# Patient Record
Sex: MALE | Race: BLACK OR AFRICAN AMERICAN | Employment: FULL TIME | URBAN - METROPOLITAN AREA
[De-identification: names, ages, dates, MRNs, and addresses within clinical notes are randomized per-mention and may not be internally consistent; named-entity substitution may affect disease eponyms.]

---

## 2018-08-27 ENCOUNTER — HOSPITAL ENCOUNTER (INPATIENT)
Age: 58
LOS: 1 days | Discharge: ANOTHER HOSPITAL | DRG: 441 | End: 2018-08-28
Attending: EMERGENCY MEDICINE | Admitting: HOSPITALIST
Payer: COMMERCIAL

## 2018-08-27 ENCOUNTER — APPOINTMENT (OUTPATIENT)
Dept: GENERAL RADIOLOGY | Age: 58
DRG: 441 | End: 2018-08-27
Attending: PHYSICIAN ASSISTANT
Payer: COMMERCIAL

## 2018-08-27 ENCOUNTER — APPOINTMENT (OUTPATIENT)
Dept: CT IMAGING | Age: 58
DRG: 441 | End: 2018-08-27
Attending: PHYSICIAN ASSISTANT
Payer: COMMERCIAL

## 2018-08-27 DIAGNOSIS — R79.89 INCREASED AMMONIA LEVEL: ICD-10-CM

## 2018-08-27 DIAGNOSIS — R18.8 CIRRHOSIS OF LIVER WITH ASCITES, UNSPECIFIED HEPATIC CIRRHOSIS TYPE (HCC): ICD-10-CM

## 2018-08-27 DIAGNOSIS — N28.9 ACUTE ON CHRONIC RENAL INSUFFICIENCY: Primary | ICD-10-CM

## 2018-08-27 DIAGNOSIS — K74.60 CIRRHOSIS OF LIVER WITH ASCITES, UNSPECIFIED HEPATIC CIRRHOSIS TYPE (HCC): ICD-10-CM

## 2018-08-27 DIAGNOSIS — N18.9 ACUTE ON CHRONIC RENAL INSUFFICIENCY: Primary | ICD-10-CM

## 2018-08-27 PROBLEM — N17.9 ACUTE ON CHRONIC RENAL FAILURE (HCC): Status: ACTIVE | Noted: 2018-08-27

## 2018-08-27 PROBLEM — K76.82 HEPATIC ENCEPHALOPATHY: Status: ACTIVE | Noted: 2018-08-27

## 2018-08-27 LAB
ALBUMIN SERPL-MCNC: 3.7 G/DL (ref 3.4–5)
ALBUMIN/GLOB SERPL: 1 {RATIO} (ref 0.8–1.7)
ALP SERPL-CCNC: 191 U/L (ref 45–117)
ALT SERPL-CCNC: 32 U/L (ref 16–61)
AMMONIA PLAS-SCNC: 144 UMOL/L (ref 11–32)
ANION GAP SERPL CALC-SCNC: 12 MMOL/L (ref 3–18)
APTT PPP: 34.6 SEC (ref 23–36.4)
AST SERPL-CCNC: 49 U/L (ref 15–37)
BASOPHILS # BLD: 0 K/UL (ref 0–0.1)
BASOPHILS NFR BLD: 0 % (ref 0–2)
BILIRUB SERPL-MCNC: 2 MG/DL (ref 0.2–1)
BUN SERPL-MCNC: 57 MG/DL (ref 7–18)
BUN/CREAT SERPL: 12 (ref 12–20)
CALCIUM SERPL-MCNC: 9.4 MG/DL (ref 8.5–10.1)
CHLORIDE SERPL-SCNC: 104 MMOL/L (ref 100–108)
CK MB CFR SERPL CALC: 1.1 % (ref 0–4)
CK MB SERPL-MCNC: 1.5 NG/ML (ref 5–25)
CK SERPL-CCNC: 141 U/L (ref 39–308)
CO2 SERPL-SCNC: 24 MMOL/L (ref 21–32)
CREAT SERPL-MCNC: 4.95 MG/DL (ref 0.6–1.3)
DIFFERENTIAL METHOD BLD: ABNORMAL
EOSINOPHIL # BLD: 0 K/UL (ref 0–0.4)
EOSINOPHIL NFR BLD: 1 % (ref 0–5)
ERYTHROCYTE [DISTWIDTH] IN BLOOD BY AUTOMATED COUNT: 14.9 % (ref 11.6–14.5)
GLOBULIN SER CALC-MCNC: 3.7 G/DL (ref 2–4)
GLUCOSE SERPL-MCNC: 120 MG/DL (ref 74–99)
HCT VFR BLD AUTO: 33.4 % (ref 36–48)
HGB BLD-MCNC: 11.5 G/DL (ref 13–16)
INR PPP: 1.6 (ref 0.8–1.2)
LIPASE SERPL-CCNC: 2271 U/L (ref 73–393)
LYMPHOCYTES # BLD: 1.6 K/UL (ref 0.9–3.6)
LYMPHOCYTES NFR BLD: 19 % (ref 21–52)
MCH RBC QN AUTO: 30.7 PG (ref 24–34)
MCHC RBC AUTO-ENTMCNC: 34.4 G/DL (ref 31–37)
MCV RBC AUTO: 89.1 FL (ref 74–97)
MONOCYTES # BLD: 1 K/UL (ref 0.05–1.2)
MONOCYTES NFR BLD: 12 % (ref 3–10)
NEUTS SEG # BLD: 5.8 K/UL (ref 1.8–8)
NEUTS SEG NFR BLD: 68 % (ref 40–73)
PLATELET # BLD AUTO: 111 K/UL (ref 135–420)
PMV BLD AUTO: 11.6 FL (ref 9.2–11.8)
POTASSIUM SERPL-SCNC: 5.2 MMOL/L (ref 3.5–5.5)
PROT SERPL-MCNC: 7.4 G/DL (ref 6.4–8.2)
PROTHROMBIN TIME: 18.7 SEC (ref 11.5–15.2)
RBC # BLD AUTO: 3.75 M/UL (ref 4.7–5.5)
SODIUM SERPL-SCNC: 140 MMOL/L (ref 136–145)
TROPONIN I SERPL-MCNC: <0.02 NG/ML (ref 0–0.06)
WBC # BLD AUTO: 8.5 K/UL (ref 4.6–13.2)

## 2018-08-27 PROCEDURE — 99285 EMERGENCY DEPT VISIT HI MDM: CPT

## 2018-08-27 PROCEDURE — 74011250636 HC RX REV CODE- 250/636: Performed by: HOSPITALIST

## 2018-08-27 PROCEDURE — 65270000029 HC RM PRIVATE

## 2018-08-27 PROCEDURE — 74011250637 HC RX REV CODE- 250/637: Performed by: PHYSICIAN ASSISTANT

## 2018-08-27 PROCEDURE — 85610 PROTHROMBIN TIME: CPT | Performed by: PHYSICIAN ASSISTANT

## 2018-08-27 PROCEDURE — 93005 ELECTROCARDIOGRAM TRACING: CPT

## 2018-08-27 PROCEDURE — 80053 COMPREHEN METABOLIC PANEL: CPT | Performed by: PHYSICIAN ASSISTANT

## 2018-08-27 PROCEDURE — 83690 ASSAY OF LIPASE: CPT | Performed by: PHYSICIAN ASSISTANT

## 2018-08-27 PROCEDURE — 82550 ASSAY OF CK (CPK): CPT | Performed by: PHYSICIAN ASSISTANT

## 2018-08-27 PROCEDURE — 87086 URINE CULTURE/COLONY COUNT: CPT | Performed by: HOSPITALIST

## 2018-08-27 PROCEDURE — 82140 ASSAY OF AMMONIA: CPT | Performed by: PHYSICIAN ASSISTANT

## 2018-08-27 PROCEDURE — 96360 HYDRATION IV INFUSION INIT: CPT

## 2018-08-27 PROCEDURE — 74011250637 HC RX REV CODE- 250/637: Performed by: HOSPITALIST

## 2018-08-27 PROCEDURE — 74011250636 HC RX REV CODE- 250/636: Performed by: PHYSICIAN ASSISTANT

## 2018-08-27 PROCEDURE — 74022 RADEX COMPL AQT ABD SERIES: CPT

## 2018-08-27 PROCEDURE — 85025 COMPLETE CBC W/AUTO DIFF WBC: CPT | Performed by: PHYSICIAN ASSISTANT

## 2018-08-27 PROCEDURE — 85730 THROMBOPLASTIN TIME PARTIAL: CPT | Performed by: PHYSICIAN ASSISTANT

## 2018-08-27 PROCEDURE — 74176 CT ABD & PELVIS W/O CONTRAST: CPT

## 2018-08-27 PROCEDURE — 74011636320 HC RX REV CODE- 636/320: Performed by: PHYSICIAN ASSISTANT

## 2018-08-27 PROCEDURE — 81001 URINALYSIS AUTO W/SCOPE: CPT | Performed by: PHYSICIAN ASSISTANT

## 2018-08-27 RX ORDER — NADOLOL 20 MG/1
40 TABLET ORAL DAILY
Status: DISCONTINUED | OUTPATIENT
Start: 2018-08-28 | End: 2018-08-28

## 2018-08-27 RX ORDER — ONDANSETRON 2 MG/ML
4 INJECTION INTRAMUSCULAR; INTRAVENOUS
Status: DISCONTINUED | OUTPATIENT
Start: 2018-08-27 | End: 2018-08-29 | Stop reason: HOSPADM

## 2018-08-27 RX ORDER — SODIUM CHLORIDE 9 MG/ML
125 INJECTION, SOLUTION INTRAVENOUS CONTINUOUS
Status: DISCONTINUED | OUTPATIENT
Start: 2018-08-27 | End: 2018-08-28

## 2018-08-27 RX ORDER — PANTOPRAZOLE SODIUM 40 MG/1
40 TABLET, DELAYED RELEASE ORAL
Status: DISCONTINUED | OUTPATIENT
Start: 2018-08-28 | End: 2018-08-28

## 2018-08-27 RX ADMIN — SODIUM CHLORIDE 500 ML: 900 INJECTION, SOLUTION INTRAVENOUS at 16:50

## 2018-08-27 RX ADMIN — LACTULOSE 20 G: 20 SOLUTION ORAL at 22:23

## 2018-08-27 RX ADMIN — IOHEXOL: 240 INJECTION, SOLUTION INTRATHECAL; INTRAVASCULAR; INTRAVENOUS; ORAL at 16:23

## 2018-08-27 RX ADMIN — SODIUM CHLORIDE 1000 ML: 900 INJECTION, SOLUTION INTRAVENOUS at 19:47

## 2018-08-27 RX ADMIN — LACTULOSE 20 G: 20 SOLUTION ORAL at 19:46

## 2018-08-27 RX ADMIN — SODIUM CHLORIDE 75 ML/HR: 900 INJECTION, SOLUTION INTRAVENOUS at 22:23

## 2018-08-27 NOTE — ED NOTES
Assumed care of patient. Patient unable to produce urine sample at this time. Patient is aware of need for urine sample.

## 2018-08-27 NOTE — ED NOTES
Patient reports his last BM was last night and that it was a normal stool. Denies dark, tarry, or bloody stool. Patient reports nausea that is not new. Denies vomiting.
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
## 2018-08-27 NOTE — ED NOTES
Patient resting quietly with eyes closed by woke easily to voice. Patient is drinking PO contrast at this time.  Call bell within reach- patient was instructed to notify nurse when he has drank contrast.

## 2018-08-27 NOTE — Clinical Note
Status[de-identified] Inpatient [101] Type of Bed: Medical [8] Inpatient Hospitalization Certified Necessary for the Following Reasons: 3. Patient receiving treatment that can only be provided in an inpatient setting (further clarification in H&P documentation) Admitting Diagnosis: [facility] on chronic [facility] [[id number]] Admitting Diagnosis: Cirrhosis of liver with ascites ([facility] [[id number]] Admitting Physician: Zafar Alejandre [[id number]] Attending Physician: Zafar Alejandre [[id number]] Estimated Length of Stay: 2 Midnights Discharge Plan[de-identified] Home with Office Follow-up

## 2018-08-28 ENCOUNTER — APPOINTMENT (OUTPATIENT)
Dept: ULTRASOUND IMAGING | Age: 58
DRG: 441 | End: 2018-08-28
Attending: HOSPITALIST
Payer: COMMERCIAL

## 2018-08-28 ENCOUNTER — APPOINTMENT (OUTPATIENT)
Dept: GENERAL RADIOLOGY | Age: 58
DRG: 441 | End: 2018-08-28
Attending: HOSPITALIST
Payer: COMMERCIAL

## 2018-08-28 ENCOUNTER — APPOINTMENT (OUTPATIENT)
Dept: NON INVASIVE DIAGNOSTICS | Age: 58
DRG: 441 | End: 2018-08-28
Attending: HOSPITALIST
Payer: COMMERCIAL

## 2018-08-28 ENCOUNTER — APPOINTMENT (OUTPATIENT)
Dept: NUCLEAR MEDICINE | Age: 58
DRG: 441 | End: 2018-08-28
Attending: HOSPITALIST
Payer: COMMERCIAL

## 2018-08-28 VITALS
SYSTOLIC BLOOD PRESSURE: 128 MMHG | HEIGHT: 69 IN | TEMPERATURE: 98 F | RESPIRATION RATE: 14 BRPM | WEIGHT: 147 LBS | OXYGEN SATURATION: 100 % | HEART RATE: 82 BPM | DIASTOLIC BLOOD PRESSURE: 78 MMHG | BODY MASS INDEX: 21.77 KG/M2

## 2018-08-28 PROBLEM — N39.0 UTI (URINARY TRACT INFECTION): Status: ACTIVE | Noted: 2018-08-28

## 2018-08-28 PROBLEM — E87.20 METABOLIC ACIDOSIS: Status: ACTIVE | Noted: 2018-08-28

## 2018-08-28 PROBLEM — K92.2 GI BLEEDING: Status: ACTIVE | Noted: 2018-08-28

## 2018-08-28 LAB
ALBUMIN SERPL-MCNC: 3.6 G/DL (ref 3.4–5)
ALBUMIN/GLOB SERPL: 1 {RATIO} (ref 0.8–1.7)
ALP SERPL-CCNC: 173 U/L (ref 45–117)
ALT SERPL-CCNC: 31 U/L (ref 16–61)
AMMONIA PLAS-SCNC: 144 UMOL/L (ref 11–32)
ANION GAP SERPL CALC-SCNC: 16 MMOL/L (ref 3–18)
APPEARANCE UR: CLEAR
ARTERIAL PATENCY WRIST A: YES
AST SERPL-CCNC: 43 U/L (ref 15–37)
BACTERIA URNS QL MICRO: ABNORMAL /HPF
BASE DEFICIT BLD-SCNC: 3 MMOL/L
BDY SITE: ABNORMAL
BILIRUB SERPL-MCNC: 2.3 MG/DL (ref 0.2–1)
BILIRUB UR QL: NEGATIVE
BUN SERPL-MCNC: 50 MG/DL (ref 7–18)
BUN/CREAT SERPL: 10 (ref 12–20)
CALCIUM SERPL-MCNC: 9.3 MG/DL (ref 8.5–10.1)
CHLORIDE SERPL-SCNC: 106 MMOL/L (ref 100–108)
CK MB CFR SERPL CALC: 1.2 % (ref 0–4)
CK MB SERPL-MCNC: 2.2 NG/ML (ref 5–25)
CK SERPL-CCNC: 191 U/L (ref 39–308)
CO2 SERPL-SCNC: 19 MMOL/L (ref 21–32)
COLOR UR: YELLOW
CREAT SERPL-MCNC: 4.87 MG/DL (ref 0.6–1.3)
CREAT UR-MCNC: 114.09 MG/DL (ref 30–125)
CREAT UR-MCNC: 114.6 MG/DL (ref 30–125)
ECHO AO ARCH DIAM: 2.56 CM
ECHO AO ASC DIAM: 2.98 CM
ECHO AO ROOT DIAM: 3.02 CM
ECHO AV AREA PEAK VELOCITY: 3.1 CM2
ECHO AV AREA VTI: 3 CM2
ECHO AV AREA/BSA PEAK VELOCITY: 1.7 CM2/M2
ECHO AV AREA/BSA VTI: 1.6 CM2/M2
ECHO AV MEAN GRADIENT: 4.6 MMHG
ECHO AV PEAK GRADIENT: 9.1 MMHG
ECHO AV PEAK VELOCITY: 150.78 CM/S
ECHO AV VTI: 27.41 CM
ECHO LA MAJOR AXIS: 3.59 CM
ECHO LA VOL 2C: 33.67 ML (ref 18–58)
ECHO LA VOL 4C: 36.04 ML (ref 18–58)
ECHO LA VOL BP: 37.64 ML (ref 18–58)
ECHO LA VOL/BSA BIPLANE: 20.77 ML/M2
ECHO LA VOLUME INDEX A2C: 18.58 ML/M2
ECHO LA VOLUME INDEX A4C: 19.89 ML/M2
ECHO LV E' LATERAL VELOCITY: 0.13 CM/S
ECHO LV E' SEPTAL VELOCITY: 0.13 CM/S
ECHO LV EDV A2C: 129.7 ML
ECHO LV EDV A4C: 91.8 ML
ECHO LV EDV BP: 108.7 ML (ref 67–155)
ECHO LV EDV INDEX A4C: 50.7 ML/M2
ECHO LV EDV INDEX BP: 60 ML/M2
ECHO LV EDV NDEX A2C: 71.6 ML/M2
ECHO LV EJECTION FRACTION A2C: 68 %
ECHO LV EJECTION FRACTION A4C: 67 %
ECHO LV EJECTION FRACTION BIPLANE: 67.7 % (ref 55–100)
ECHO LV ESV A2C: 41.1 ML
ECHO LV ESV A4C: 30.5 ML
ECHO LV ESV BP: 35.1 ML (ref 22–58)
ECHO LV ESV INDEX A2C: 22.7 ML/M2
ECHO LV ESV INDEX A4C: 16.8 ML/M2
ECHO LV ESV INDEX BP: 19.4 ML/M2
ECHO LV INTERNAL DIMENSION DIASTOLIC: 4.46 CM (ref 4.2–5.9)
ECHO LV INTERNAL DIMENSION SYSTOLIC: 2.64 CM
ECHO LV IVSD: 0.91 CM (ref 0.6–1)
ECHO LV MASS 2D: 148.1 G (ref 88–224)
ECHO LV MASS INDEX 2D: 81.7 G/M2
ECHO LV POSTERIOR WALL DIASTOLIC: 0.88 CM (ref 0.6–1)
ECHO LVOT DIAM: 2.17 CM
ECHO LVOT PEAK GRADIENT: 6.3 MMHG
ECHO LVOT PEAK VELOCITY: 125.54 CM/S
ECHO LVOT VTI: 21.9 CM
ECHO MV A VELOCITY: 91.23 CM/S
ECHO MV AREA PHT: 5.2 CM2
ECHO MV E DECELERATION TIME (DT): 145.5 MS
ECHO MV E VELOCITY: 1.14 CM/S
ECHO MV E/A RATIO: 0.01
ECHO MV E/E' LATERAL: 8.77
ECHO MV E/E' RATIO (AVERAGED): 8.77
ECHO MV E/E' SEPTAL: 8.77
ECHO MV PRESSURE HALF TIME (PHT): 42.2 MS
ECHO RA AREA 4C: 6.61 CM2
ECHO RV INTERNAL DIMENSION: 3.06 CM
ECHO RV TAPSE: 2.7 CM (ref 1.5–2)
ECHO TV REGURGITANT MAX VELOCITY: 250.09 CM/S
ECHO TV REGURGITANT PEAK GRADIENT: 25 MMHG
EOSINOPHIL #/AREA URNS HPF: NORMAL /[HPF]
EPITH CASTS URNS QL MICRO: ABNORMAL /LPF (ref 0–5)
ERYTHROCYTE [DISTWIDTH] IN BLOOD BY AUTOMATED COUNT: 15.4 % (ref 11.6–14.5)
GAS FLOW.O2 O2 DELIVERY SYS: ABNORMAL L/MIN
GLOBULIN SER CALC-MCNC: 3.5 G/DL (ref 2–4)
GLUCOSE BLD STRIP.AUTO-MCNC: 130 MG/DL (ref 70–110)
GLUCOSE BLD STRIP.AUTO-MCNC: 95 MG/DL (ref 70–110)
GLUCOSE SERPL-MCNC: 120 MG/DL (ref 74–99)
GLUCOSE UR STRIP.AUTO-MCNC: NEGATIVE MG/DL
HCO3 BLD-SCNC: 21.7 MMOL/L (ref 22–26)
HCT VFR BLD AUTO: 26.4 % (ref 36–48)
HCT VFR BLD AUTO: 27.7 % (ref 36–48)
HCT VFR BLD AUTO: 28.9 % (ref 36–48)
HCT VFR BLD AUTO: 33.9 % (ref 36–48)
HGB BLD-MCNC: 11.4 G/DL (ref 13–16)
HGB BLD-MCNC: 9.1 G/DL (ref 13–16)
HGB BLD-MCNC: 9.6 G/DL (ref 13–16)
HGB BLD-MCNC: 9.8 G/DL (ref 13–16)
HGB UR QL STRIP: NEGATIVE
KETONES UR QL STRIP.AUTO: NEGATIVE MG/DL
LACTATE SERPL-SCNC: 1.5 MMOL/L (ref 0.4–2)
LACTATE SERPL-SCNC: 2 MMOL/L (ref 0.4–2)
LACTATE SERPL-SCNC: 3.3 MMOL/L (ref 0.4–2)
LACTATE SERPL-SCNC: 6.8 MMOL/L (ref 0.4–2)
LEUKOCYTE ESTERASE UR QL STRIP.AUTO: ABNORMAL
LIPASE SERPL-CCNC: 778 U/L (ref 73–393)
MAGNESIUM SERPL-MCNC: 2 MG/DL (ref 1.6–2.6)
MCH RBC QN AUTO: 30.5 PG (ref 24–34)
MCHC RBC AUTO-ENTMCNC: 33.6 G/DL (ref 31–37)
MCV RBC AUTO: 90.6 FL (ref 74–97)
MUCOUS THREADS URNS QL MICRO: ABNORMAL /LPF
NITRITE UR QL STRIP.AUTO: NEGATIVE
O2/TOTAL GAS SETTING VFR VENT: 21 %
OTHER,OTHU: ABNORMAL
PCO2 BLD: 33.1 MMHG (ref 35–45)
PH BLD: 7.42 [PH] (ref 7.35–7.45)
PH UR STRIP: 5 [PH] (ref 5–8)
PLATELET # BLD AUTO: 125 K/UL (ref 135–420)
PMV BLD AUTO: 11.7 FL (ref 9.2–11.8)
PO2 BLD: 81 MMHG (ref 80–100)
POTASSIUM SERPL-SCNC: 4.8 MMOL/L (ref 3.5–5.5)
PROT SERPL-MCNC: 7.1 G/DL (ref 6.4–8.2)
PROT UR STRIP-MCNC: NEGATIVE MG/DL
PROT UR-MCNC: 27 MG/DL
PROT/CREAT UR-RTO: 0.2
RBC # BLD AUTO: 3.74 M/UL (ref 4.7–5.5)
RBC #/AREA URNS HPF: NEGATIVE /HPF (ref 0–5)
SAO2 % BLD: 96 % (ref 92–97)
SERVICE CMNT-IMP: ABNORMAL
SODIUM SERPL-SCNC: 141 MMOL/L (ref 136–145)
SODIUM UR-SCNC: 25 MMOL/L (ref 20–110)
SP GR UR REFRACTOMETRY: 1.01 (ref 1–1.03)
SPECIMEN TYPE: ABNORMAL
TROPONIN I SERPL-MCNC: <0.02 NG/ML (ref 0–0.06)
UROBILINOGEN UR QL STRIP.AUTO: 0.2 EU/DL (ref 0.2–1)
WBC # BLD AUTO: 8 K/UL (ref 4.6–13.2)
WBC URNS QL MICRO: ABNORMAL /HPF (ref 0–5)

## 2018-08-28 PROCEDURE — 77030033269 HC SLV COMPR SCD KNE2 CARD -B

## 2018-08-28 PROCEDURE — C9113 INJ PANTOPRAZOLE SODIUM, VIA: HCPCS | Performed by: HOSPITALIST

## 2018-08-28 PROCEDURE — 83690 ASSAY OF LIPASE: CPT | Performed by: HOSPITALIST

## 2018-08-28 PROCEDURE — 83735 ASSAY OF MAGNESIUM: CPT | Performed by: HOSPITALIST

## 2018-08-28 PROCEDURE — 74011000258 HC RX REV CODE- 258: Performed by: HOSPITALIST

## 2018-08-28 PROCEDURE — 82962 GLUCOSE BLOOD TEST: CPT

## 2018-08-28 PROCEDURE — 71045 X-RAY EXAM CHEST 1 VIEW: CPT

## 2018-08-28 PROCEDURE — 87040 BLOOD CULTURE FOR BACTERIA: CPT | Performed by: HOSPITALIST

## 2018-08-28 PROCEDURE — 0W9G30Z DRAINAGE OF PERITONEAL CAVITY WITH DRAINAGE DEVICE, PERCUTANEOUS APPROACH: ICD-10-PCS | Performed by: RADIOLOGY

## 2018-08-28 PROCEDURE — 36415 COLL VENOUS BLD VENIPUNCTURE: CPT | Performed by: HOSPITALIST

## 2018-08-28 PROCEDURE — 82570 ASSAY OF URINE CREATININE: CPT | Performed by: INTERNAL MEDICINE

## 2018-08-28 PROCEDURE — 74011250636 HC RX REV CODE- 250/636: Performed by: HOSPITALIST

## 2018-08-28 PROCEDURE — 74011000250 HC RX REV CODE- 250: Performed by: HOSPITALIST

## 2018-08-28 PROCEDURE — 80053 COMPREHEN METABOLIC PANEL: CPT | Performed by: HOSPITALIST

## 2018-08-28 PROCEDURE — 74018 RADEX ABDOMEN 1 VIEW: CPT

## 2018-08-28 PROCEDURE — 84300 ASSAY OF URINE SODIUM: CPT | Performed by: INTERNAL MEDICINE

## 2018-08-28 PROCEDURE — A9567 TECHNETIUM TC-99M AEROSOL: HCPCS

## 2018-08-28 PROCEDURE — 84156 ASSAY OF PROTEIN URINE: CPT | Performed by: INTERNAL MEDICINE

## 2018-08-28 PROCEDURE — 76770 US EXAM ABDO BACK WALL COMP: CPT

## 2018-08-28 PROCEDURE — 74011000258 HC RX REV CODE- 258: Performed by: INTERNAL MEDICINE

## 2018-08-28 PROCEDURE — 82553 CREATINE MB FRACTION: CPT | Performed by: HOSPITALIST

## 2018-08-28 PROCEDURE — 74011250637 HC RX REV CODE- 250/637: Performed by: INTERNAL MEDICINE

## 2018-08-28 PROCEDURE — 87086 URINE CULTURE/COLONY COUNT: CPT | Performed by: HOSPITALIST

## 2018-08-28 PROCEDURE — P9047 ALBUMIN (HUMAN), 25%, 50ML: HCPCS | Performed by: HOSPITALIST

## 2018-08-28 PROCEDURE — 87205 SMEAR GRAM STAIN: CPT | Performed by: INTERNAL MEDICINE

## 2018-08-28 PROCEDURE — 85018 HEMOGLOBIN: CPT | Performed by: HOSPITALIST

## 2018-08-28 PROCEDURE — 85027 COMPLETE CBC AUTOMATED: CPT | Performed by: HOSPITALIST

## 2018-08-28 PROCEDURE — 76450000000

## 2018-08-28 PROCEDURE — 93005 ELECTROCARDIOGRAM TRACING: CPT

## 2018-08-28 PROCEDURE — 36600 WITHDRAWAL OF ARTERIAL BLOOD: CPT

## 2018-08-28 PROCEDURE — 77030034850

## 2018-08-28 PROCEDURE — 74011636637 HC RX REV CODE- 636/637: Performed by: HOSPITALIST

## 2018-08-28 PROCEDURE — 82803 BLOOD GASES ANY COMBINATION: CPT

## 2018-08-28 PROCEDURE — 93306 TTE W/DOPPLER COMPLETE: CPT

## 2018-08-28 PROCEDURE — 82140 ASSAY OF AMMONIA: CPT | Performed by: HOSPITALIST

## 2018-08-28 PROCEDURE — 77010033678 HC OXYGEN DAILY

## 2018-08-28 PROCEDURE — 83605 ASSAY OF LACTIC ACID: CPT | Performed by: HOSPITALIST

## 2018-08-28 PROCEDURE — 49083 ABD PARACENTESIS W/IMAGING: CPT

## 2018-08-28 RX ORDER — SODIUM CHLORIDE 0.9 % (FLUSH) 0.9 %
5-10 SYRINGE (ML) INJECTION AS NEEDED
Status: DISCONTINUED | OUTPATIENT
Start: 2018-08-28 | End: 2018-08-29 | Stop reason: HOSPADM

## 2018-08-28 RX ORDER — LEVOFLOXACIN 5 MG/ML
250 INJECTION, SOLUTION INTRAVENOUS
Qty: 250 ML | Refills: 0 | Status: SHIPPED
Start: 2018-08-30 | End: 2018-12-11

## 2018-08-28 RX ORDER — METOPROLOL TARTRATE 5 MG/5ML
5 INJECTION INTRAVENOUS ONCE
Status: COMPLETED | OUTPATIENT
Start: 2018-08-28 | End: 2018-08-28

## 2018-08-28 RX ORDER — NADOLOL 20 MG/1
20 TABLET ORAL
Status: DISCONTINUED | OUTPATIENT
Start: 2018-08-31 | End: 2018-08-29 | Stop reason: HOSPADM

## 2018-08-28 RX ORDER — METOPROLOL TARTRATE 5 MG/5ML
5 INJECTION INTRAVENOUS
Status: DISCONTINUED | OUTPATIENT
Start: 2018-08-28 | End: 2018-08-29 | Stop reason: HOSPADM

## 2018-08-28 RX ORDER — MORPHINE SULFATE 2 MG/ML
1 INJECTION, SOLUTION INTRAMUSCULAR; INTRAVENOUS
Status: DISCONTINUED | OUTPATIENT
Start: 2018-08-28 | End: 2018-08-29 | Stop reason: HOSPADM

## 2018-08-28 RX ORDER — LIDOCAINE HYDROCHLORIDE 10 MG/ML
10 INJECTION, SOLUTION EPIDURAL; INFILTRATION; INTRACAUDAL; PERINEURAL
Status: COMPLETED | OUTPATIENT
Start: 2018-08-28 | End: 2018-08-28

## 2018-08-28 RX ORDER — DEXTROSE MONOHYDRATE AND SODIUM CHLORIDE 5; .45 G/100ML; G/100ML
100 INJECTION, SOLUTION INTRAVENOUS CONTINUOUS
Status: DISCONTINUED | OUTPATIENT
Start: 2018-08-28 | End: 2018-08-29 | Stop reason: HOSPADM

## 2018-08-28 RX ORDER — LEVOFLOXACIN 5 MG/ML
250 INJECTION, SOLUTION INTRAVENOUS
Status: DISCONTINUED | OUTPATIENT
Start: 2018-08-28 | End: 2018-08-29 | Stop reason: HOSPADM

## 2018-08-28 RX ORDER — SODIUM BICARBONATE 1 MEQ/ML
50 SYRINGE (ML) INTRAVENOUS ONCE
Status: COMPLETED | OUTPATIENT
Start: 2018-08-28 | End: 2018-08-28

## 2018-08-28 RX ORDER — ALBUMIN HUMAN 250 G/1000ML
25 SOLUTION INTRAVENOUS EVERY 6 HOURS
Status: DISCONTINUED | OUTPATIENT
Start: 2018-08-28 | End: 2018-08-29 | Stop reason: HOSPADM

## 2018-08-28 RX ADMIN — SODIUM CHLORIDE 500 ML: 900 INJECTION, SOLUTION INTRAVENOUS at 07:02

## 2018-08-28 RX ADMIN — SODIUM BICARBONATE 50 MEQ: 84 INJECTION, SOLUTION INTRAVENOUS at 08:13

## 2018-08-28 RX ADMIN — ALBUMIN (HUMAN) 25 G: 0.25 INJECTION, SOLUTION INTRAVENOUS at 08:35

## 2018-08-28 RX ADMIN — OCTREOTIDE ACETATE 25 MCG/HR: 500 INJECTION, SOLUTION INTRAVENOUS; SUBCUTANEOUS at 08:05

## 2018-08-28 RX ADMIN — ALBUMIN (HUMAN) 25 G: 0.25 INJECTION, SOLUTION INTRAVENOUS at 13:15

## 2018-08-28 RX ADMIN — LEVOFLOXACIN 250 MG: 5 INJECTION, SOLUTION INTRAVENOUS at 05:54

## 2018-08-28 RX ADMIN — ONDANSETRON 4 MG: 2 INJECTION, SOLUTION INTRAMUSCULAR; INTRAVENOUS at 00:55

## 2018-08-28 RX ADMIN — DEXTROSE MONOHYDRATE AND SODIUM CHLORIDE 100 ML/HR: 5; .45 INJECTION, SOLUTION INTRAVENOUS at 12:33

## 2018-08-28 RX ADMIN — PROMETHAZINE HYDROCHLORIDE 25 MG: 25 INJECTION, SOLUTION INTRAMUSCULAR; INTRAVENOUS at 07:02

## 2018-08-28 RX ADMIN — METOPROLOL TARTRATE 5 MG: 5 INJECTION, SOLUTION INTRAVENOUS at 05:00

## 2018-08-28 RX ADMIN — LACTULOSE 150 ML: 10 SOLUTION ORAL at 18:34

## 2018-08-28 RX ADMIN — MORPHINE SULFATE 1 MG: 2 INJECTION, SOLUTION INTRAMUSCULAR; INTRAVENOUS at 06:54

## 2018-08-28 RX ADMIN — LIDOCAINE HYDROCHLORIDE 2 ML: 10 INJECTION, SOLUTION EPIDURAL; INFILTRATION; INTRACAUDAL; PERINEURAL at 08:52

## 2018-08-28 RX ADMIN — ONDANSETRON 4 MG: 2 INJECTION, SOLUTION INTRAMUSCULAR; INTRAVENOUS at 06:54

## 2018-08-28 RX ADMIN — ALBUMIN (HUMAN) 25 G: 0.25 INJECTION, SOLUTION INTRAVENOUS at 18:34

## 2018-08-28 RX ADMIN — SODIUM CHLORIDE 40 MG: 9 INJECTION INTRAMUSCULAR; INTRAVENOUS; SUBCUTANEOUS at 08:13

## 2018-08-28 RX ADMIN — WATER 1 G: 1 INJECTION INTRAMUSCULAR; INTRAVENOUS; SUBCUTANEOUS at 05:53

## 2018-08-28 NOTE — ROUTINE PROCESS
Therapeutic paracentesis performed by Dr Branden Mayers. 3800ml of fluid drained. No labs needed on fluid per order. Patient tolerated procedure well.

## 2018-08-28 NOTE — PROGRESS NOTES
0500 - Bedside and Verbal shift change report given to Rylie Byrnes RN (oncoming nurse) by Kourtney Calles RN (offgoing nurse). Report included the following information SBAR, Kardex, ED Summary, Intake/Output, MAR, Recent Results, Med Rec Status and Cardiac Rhythm ST.   0545 - Pt transferred down to ICU, pt N/V brown foul smelling emesis. , /88, RR 18, SpO2 100%, alert and oriented x4, calm. Pt can move self over to ICU bed, MAEE. Denies all pain at this time. Denies shortness of breath and dizziness and chest pain. Eyes show some jaundice, abdomen is distended and somewhat tender. Scheduled for paracentesis today. Will continue to monitor. 0600 - US at the bedside. 1827 - PIV inserted   7919 - Critical Lactate called 6.7   0630 - Dr Radha Romero paged concerning lactic. Discussed target fluids, per protocol pt would need to receive 2007ml; however pt has already received 2400 since admission. Dr Radha Romero orders Q4 lactaces, KUB, and phenergan for continued N/V, 500cc bolus ordered as well.   7857 - Amado inserted, pt complaining of pain and continues to vomit brown/reddish foul smelling emesis. Pain medication administer per orders as well as Zofran and phenergan. 0715 - Bedside and Verbal shift change report given to [facility]  (oncoming nurse) by Rylie Byrnes RN (offgoing nurse).  Report included the following information SBAR, Kardex, ED Summary, Intake/Output, MAR, Recent Results, Med Rec Status and Cardiac Rhythm ST.

## 2018-08-28 NOTE — PROGRESS NOTES
2053 Patient admitted to bed 332 from ED. Admission data base completed. and admission assessment done. Pt appears to be weak. Wife at bedside. Will monitor    2310: Went to assess pt. Pt experiencing nausea then vomiting x2. Vomit is thin and dark brown in color. Will notify provider. 0010: During ambulation to bathroom pts heart rate continues to be high above 150. Pt appears to be asymptomatic.     0049: Notified Dr. Leonel Sarkar of pts vomiting and tachycardia during ambulation. Order received to administer Zofran and increase IV fluids NS to 125 ml/hr. Will monitor. 0330: Pt become hypertensive, /100 manually. HR is now tachycardic at rest 120s. Will notify provider. 4398: Notified Dr. Leonel Sarkar of pts condition. Vitals reported.  ordered cardiac panel, EKG stat and 5mg lopressor and to transfer the patient to ICU.    0500: Lopressor administered prior to transferring pt to ICU. Refer to [employer] handoff.      0530 Patient transfer to ICU with this RN and Sander James RN

## 2018-08-28 NOTE — CDMP QUERY
Please clarify if this patient is being treated/managed for: 
 
=>Severe Protein Calorie malnutrition with cirrhosis, ascites =>Other Explanation of clinical findings =>Unable to Determine (no explanation of clinical findings) The medical record reflects the following: 
 
Risk:Cirrhosis due to Hepatitis C, ascites,  
  
Clinical Indicators:RD note states--\" Inadequate oral intake related to poor appetite and increased estimated needs as evidenced by wt loss of 25% body weight x 7 months' Treatment: Ensure Enlive TID-once diet cleared Please clarify and document your clinical opinion in the progress notes and discharge summary including the definitive and/or presumptive diagnosis, (suspected or probable), related to the above clinical findings. Please include clinical findings supporting your diagnosis. If you DECLINE this query or would like to communicate with [facility], please utilize the \"Yub message box\" at the TOP of the Progress Note on the right. Thank you, Tangela Romero RN [facility] 
[phone]

## 2018-08-28 NOTE — PROCEDURES
POST PARACENTESIS PROCEDURE NOTE:    Procedure: US Guided Paracentesis    Pre-operative Diagnosis: Ascites. Post-operative Diagnosis: same    Indication: Ascites    Procedure Details:  Procedure performed at patient's bedside in ICU. Written informed consent obtained from wife. Standard aseptic technique. Ultrasound guidance. LLQ approach. 1% lidocaine for local anesthesia. 5 [employer] Kirsten Yueh sheathed needle connected to vacuum bottle. 3800 mL of light homer fluid drained. No sample requested. Specimen: None. Complications:  None. EBL: Minimal.               Condition: Stable. Impression:  Successful paracentesis.     Plan: Per primary team.       Lucian Meehan MD  Vascular & Interventional Radiology    [facility]     8/28/2018

## 2018-08-28 NOTE — PROGRESS NOTES
Patient HB repeated and its normal and stable Lactate is 2 Low likelyhood of PE per VQ Hemodynamically stable oxygenating well on RA More awake and responsive No further bleed or tachycardia is noted Overall stable to be transferred out of ICU Management per hospitalist 
 
We will Sign off, call if things change or we can be of any help MARIA Kim [address]. [facility] [address] [facility] 8527 Phone [phone]   Fax [phone] Pulmonary Critical Care & Sleep Medicine

## 2018-08-28 NOTE — PROGRESS NOTES
Report called to nurse Jose Ennis at [facility]. Reviewed SBAR, opportunity for questions and call back number provided. Medical transport  time 1930.

## 2018-08-28 NOTE — PROGRESS NOTES
0700 Bedside and Verbal shift change report given to [employer]  (oncoming nurse) by Tara Lovett RN (offgoing nurse). Report included the following information SBAR, Kardex, ED Summary, Intake/Output, MAR, Recent Results and Cardiac Rhythm NSR . 0840 wife at bedside consenting for paracentesis to be done. paracentesis by Denver Chiquito MD. 3800mls obtained 1003 Ref. Range 8/28/2018 05:40 8/28/2018 10:03 Lactic acid Latest Ref Range: 0.4 - 2.0 MMOL/L 6.8 (HH) 3.3 (HH)  
 
 
1100 Patient very drowsy. Falling asleep in between care. Too drowsy to take oral medications. Ammonia Level 144 and getting lactulose but the type dispensed is not for rectal use. Pharmacy and Nik Milton MD made aware during a.m. Rounds will get rectal dose of lactulose. No vomiting at this time. Lactic acid trending down. LA 3.3     
 
1132 Patient off floor to Nuclear Medicine for VQ Scan 
 
1229 Patient back in room from [address] [place] [address] H&H. If H&H stable. Patient may transfer back out to floor 1300 Patient incontinent of stool. Incontinent care and oral care provided. Wife and sister at beside 1405 ECHO at bedside 1506 TRANSFER to room 348 Verbal report given to [facility] RN(3N) from [employer] (Corrie Shutters  being transferred to [facility] for routine progression of care Report consisted of patients Situation, Background, Assessment and  
Recommendations(SBAR). Information from the following report(s) SBAR, Kardex, ED Summary, Intake/Output, MAR, Recent Results and Cardiac Rhythm NSR was reviewed with the receiving nurse. Lines:  
Peripheral IV 08/27/18 Right Forearm (Active) Site Assessment Clean, dry, & intact 8/28/2018 12:00 PM  
Phlebitis Assessment 0 8/28/2018 12:00 PM  
Infiltration Assessment 0 8/28/2018 12:00 PM  
Dressing Status Clean, dry, & intact 8/28/2018 12:00 PM  
Dressing Type Transparent 8/28/2018  8:00 AM  
Hub Color/Line Status Pink; Infusing;Flushed;Patent 8/28/2018  8:00 AM  
 Action Taken Open ports on tubing capped 8/28/2018  8:00 AM  
Alcohol Cap Used Yes 8/28/2018  8:00 AM  
   
Peripheral IV 08/28/18 Left Forearm (Active) Site Assessment Clean, dry, & intact 8/28/2018 12:00 PM  
Phlebitis Assessment 0 8/28/2018 12:00 PM  
Infiltration Assessment 0 8/28/2018 12:00 PM  
Dressing Status Clean, dry, & intact 8/28/2018 12:00 PM  
Dressing Type Transparent 8/28/2018  8:00 AM  
Hub Color/Line Status Pink 8/28/2018  8:00 AM  
Action Taken Open ports on tubing capped 8/28/2018  8:00 AM  
Alcohol Cap Used Yes 8/28/2018  8:00 AM  
  
 
Opportunity for questions and clarification was provided. Patient transported with: 
 Monitor Registered Nurse

## 2018-08-28 NOTE — PROGRESS NOTES
Occupational Therapy Screening: 
Services are not indicated at this time. [facility] based on results obtained during the nursing admission assessment. The patients chart was reviewed and the patient is not appropriate for a skilled therapy evaluation at this time. Patient critically ill in need of liver transplant, awaiting transfer to [facility]. Pt w/ hepatic encephalopathy. Would benefit from PT more at this phase for strengthening prior to surgery. Would benefit from OT if prolonged stay here. Please consult occupational therapy if any therapy needs arise. Thank you.  
Jenelle Mcpherson, OTR/L

## 2018-08-28 NOTE — PROGRESS NOTES
Severe Protein Calorie malnutrition with cirrhosis, ascites-cdmp Acute/subacute Hepatic Encephalopathy with confusion as to time, ammonia level=144, cirrhosis

## 2018-08-28 NOTE — ROUTINE PROCESS
TRANSFER - OUT REPORT:    Verbal report given to Miss Ronaldo Ruano RN on Jay Izaguirre  being transferred to 305 (medical unit) for routine progression of care       Report consisted of patients Situation, Background, Assessment and   Recommendations(SBAR). Information from the following report(s) SBAR, ED Summary, [facility] and Recent Results was reviewed with the receiving nurse. Lines:   Peripheral IV 08/27/18 Left Forearm (Active)   Site Assessment Clean, dry, & intact 8/27/2018  2:18 PM   Dressing Type Transparent 8/27/2018  2:18 PM        Opportunity for questions and clarification was provided.       Patient transported with:   [employer]

## 2018-08-28 NOTE — PROGRESS NOTES
INITIAL NUTRITION ASSESSMENT 
  
RECOMMENDATIONS/PLAN:  
Advance diet as soon as clinically feasible Add ensure enlive once diet is advanced Monitor weight/fluid status Monitor labs/lytes, skin integrity and bowel function Adult Malnutrition Criteria:  
 
Nutrition assessment was completed by RDN and the patient was found to meet the following malnutrition criteria established by ASPEN/AND: 
 
Adult Malnutrition Guidelines: SEVERE PROTEIN CALORIE MALNUTRITION IN THE CONTEXT OF CHRONIC ILLNESS Weight Loss:  >5% x 1 month or >7.5% x 3 months or >10% x 6 months or >20% x 12 months Energy Intake: <75% energy intake compared to estimated energy needs >1 month Amber Days 08/28/18 REASON FOR ASSESSMENT:  
 
[]  RN Referral:  
 [] MST score >/=2 Malnutrition Screening Tool (MST): 
Recently Lost Weight Without Trying: Yes If Yes, How Much Weight Loss: 2-13 lbs Eating Poorly Due to Decreased Appetite: Yes MST Score: 2 [] Enteral/Parenteral Nutrition PTA [] Pregnant (Not in Labor):  [] Gestational DM     [] Multigestation 
 [] Pressure Ulcer/Wound Care needs NUTRITION ASSESSMENT:  
Client History: 62 yrs old Male admitted with c/o constipation. Pt found with acute on chronic kidney failure, ascites, and he is encephalopathic with an elevated ammonia lever per MD note. Pt is ICU at this time. Paracentesis planned by IR per MD note PMHx: HTN, liver failure Cultural/Spiritism Food Preferences: None Identified FOOD/NUTRITION HISTORY Diet History: likely nutritional deficits PTA Food Allergies:  [x] NKFA Pertinent PTA Medications: reviewed NUTRITION INTAKE Diet Order:  No diet order yet Average PO Intake:      
Patient Vitals for the past 100 hrs: 
 % Diet Eaten 08/28/18 1200 0 % 08/28/18 0800 0 % Pertinent Medications:  [x] Reviewed; lactulose, protonix Electrolyte Replacement Protocol: []K  []Mg  []PO4 Insulin:  [] SSI  [] Pre-meal   []  Basal   [] Drip  [x] None Pt expected to meet estimated nutrient needs through next review:          []  Yes     [x] No; npo or no diet order cannot provide for pt estimated needs ANTHROPOMETRICS Height: 5' 9\" (175.3 cm)       Weight: 66.7 kg (147 lb) BMI: 21.8 kg/m^2  -  normal weight (18.5%-24.9% BMI) Weight change: wt loss of 25% body weight x 7 months, c/w recent significant wt loss Comparison to Reference Standards: IBW: 160 lbs      %IBW: 92%      AdjBW: n/akg NUTRITION-FOCUSED PHYSICAL ASSESSMENT Skin: no pressure area per documentation GI: BM 8/28 BIOCHEMICAL DATA & MEDICAL TESTS Pertinent Labs:  [x] Reviewed; [id number] NUTRITION PRESCRIPTION Calories: 2010 kcal/day based on 30kcal/kg Protein: 80 g/day based on 1.2 g/kg Fluid: 2010 ml/day based on 1 kcal/ml NUTRITION DIAGNOSES:  
1. Inadequate oral intake related to poor appetite and increased estimated needs as evidenced by wt loss of 25% body weight x 7 months NUTRITION INTERVENTIONS:  
INTERVENTIONS:        GOALS: 
1. Commercial Beverage: Ensure enlive TID once diet is advanced 1. Once diet is advanced: Meet estimated needs via PO intakes >75% of meals and supplements throughout the next 5-7 days LEARNING NEEDS (Diet, Supplementation, Food/Nutrient-Drug Interaction):  
[x] None Identified 
[] Inpatient education provided/documented   
[] Identified and patient:  [] Declined     [] Was not appropriate/indicated NUTRITION MONITORING /EVALUATION:  
Follow PO intake Monitor wt Monitor renal labs, electrolytes, fluid status Monitor for additional supplement needs 
 
[] Participated in Interdisciplinary Rounds 
[x] [address] [place] [address] Reviewed/Documented DISCHARGE NUTRITION RECOMMENDATIONS ADDRESSED:  
  
  [x] To be determined closer to discharge NUTRITION RISK:     []  At risk                     []  Not currently at risk Will follow-up per policy. Carla Munoz, [id number] [address]
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
## 2018-08-28 NOTE — ED NOTES
Hospitalist changed patient from medical unit to telemetry unit. Report called to GABBI Keys. TRANSFER - OUT REPORT:    Verbal report given to GABBI Tomlin on Anamaria Bishop  being transferred to [facility] (telemetry unit) for routine progression of care       Report consisted of patients Situation, Background, Assessment and   Recommendations(SBAR). Information from the following report(s) SBAR, ED Summary, [facility] and Recent Results was reviewed with the receiving nurse. Lines:   Peripheral IV 08/27/18 Left Forearm (Active)   Site Assessment Clean, dry, & intact 8/27/2018  2:18 PM   Dressing Type Transparent 8/27/2018  2:18 PM        Opportunity for questions and clarification was provided.       Patient transported with:   Monitor  Registered Nurse

## 2018-08-28 NOTE — PROGRESS NOTES
Reason for Admission:  C/o constipation RRAT Score 18 Do you (patient/family) have any concerns for transition/discharge? TBD Plan for utilizing home health: TBD Likelihood of readmission? TBD Transition of Care Plan:  Chart reviewed per ED note pt c/o constipation pt admitting diagnosis hepatic encephalopathy,pt admitted to ICU for monitoring Hx liver cirrhosis on transplant list,per physician note pt may be transferred to [facility] ,cm will remain available for assistance if needed.
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
## 2018-08-28 NOTE — ROUTINE PROCESS
TRANSFER - IN REPORT: 
 
Verbal report received from JEFF Shah (name) on Lauren Beams  being received from ICU (unit) for routine progression of care Report consisted of patients Situation, Background, Assessment and  
Recommendations(SBAR). Information from the following report(s) SBAR, Kardex, Intake/Output, MAR, Recent Results and Cardiac Rhythm NSR was reviewed with the receiving nurse. Opportunity for questions and clarification was provided. Assessment completed upon patients arrival to unit and care assumed.

## 2018-08-28 NOTE — CDMP QUERY
This patient has a diagnosis of Hepatic Encephalopathy with elevated ammonia level Please further specify if: 
 
=>Acute/subacute Hepatic Encephalopathy with confusion as to time, ammonia level=144, cirrhosis =>Chronic hepatic Encephloapthy 
=>Other Explanation of clinical findings =>Unable to Determine (no explanation of clinical findings) The medical record reflects the following: 
 
Risk:hepatitis C and cirrhosis with ascites Clinical Indicators:H&P notes that pt appears weak, a little confused, but oriented to person and place, not to time 8/27--Ammonia level=144 
8/28-Ammonia level=144 Treatment: lactalose Thank you, Kacey Ahumada RN [facility] 
[phone]
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
## 2018-08-28 NOTE — PROGRESS NOTES
Hospitalist Progress Note    Patient: Mariely Madrigal MRN: [id number]  CSN: [id number]    YOB: 1960  Age: 62 y.o. Sex: male    DOA: 8/27/2018 LOS:  LOS: 1 day      Called about tachycardia-earlier it occurred when he got up to toilet and I advised increase IVF and bedrest.   Heart rate again elevating now and called by nurse. I reviewed with tele monitor tech-this is sinus tach. I have seen patient-he is asleep, and in NAD. I awakened him and he denied CP or SOB. EKG, card enzymes, lactate ordered stat  With renal failure and concern about worsening heart rate, volume depletion, will ask fo silva catheter, and transfer to ICU    UA finally resulted at 11 pm, not notified of result-it is positive for UTI-and thus he needs IV antibitoics    Add lopressor IV for HTN and tachycardia, continue IVF-he is supposed to have paracentesis today but if HR continues to be elevated he may not be able to tolerate it. Move to ICU and get echo this am as well-could he have a PE with the tachycardia??  He is not hypoxic nor complaining of SOB-and D Dimer will be elevated with renal failure-so get VQ this am

## 2018-08-28 NOTE — ROUTINE PROCESS
Bedside and Verbal shift change report given to ALEE Juarez (oncoming nurse) by Tae (offgoing nurse). Report included the following information SBAR, Kardex, Intake/Output, MAR, Recent Results and Cardiac Rhythm SR/SA.

## 2018-08-28 NOTE — DIABETES MGMT
GLYCEMIC CONTROL PROGRESS NOTE: 
 
-discussed in rounds, no known h/o DM 
-NPO & D5 IVF 
-POCT + corrective coverage orders (orders entered per Dr. Marvin Parker) Recent Glucose Results:  
Lab Results Component Value Date/Time  (H) 08/28/2018 03:30 AM  
  (H) 08/27/2018 02:15 PM  
 
Ministerio Ibrahim RN, MS 
Glycemic Control Team 
Pager [phone] (M-TH 8:30-5P) *After Hours pager [phone]

## 2018-08-28 NOTE — PROGRESS NOTES
Hospitalist Progress Note-critical care note     Patient: Sharan Sales MRN: [id number]  CSN: [id number]    YOB: 1960  Age: 62 y.o. Sex: male    DOA: 8/27/2018 LOS:  LOS: 1 day            Chief complaint:   juan on ckd, metabolic acidosis, gi bleeding, juan on ckd , hepatic encephalopathy,   Assessment/Plan         Hospital Problems  Date Reviewed: 8/27/2018          Codes Class Noted POA    UTI (urinary tract infection) ICD-10-CM: N39.0  ICD-9-CM: 599.0  8/28/2018 Unknown        Metabolic acidosis RCB-03-FN: E87.2  ICD-9-CM: 276.2  8/28/2018 Unknown        GI bleeding ICD-10-CM: K92.2  ICD-9-CM: 578.9  8/28/2018 Unknown        Acute on chronic renal failure ([facility] ICD-10-CM: N17.9, N18.9  ICD-9-CM: 584.9, 585.9  8/27/2018 Yes        Cirrhosis of liver with ascites ([facility] ICD-10-CM: K74.60  ICD-9-CM: 571.5  8/27/2018 Yes        * (Principal)Hepatic encephalopathy ([facility] ICD-10-CM: K72.90  ICD-9-CM: 572.2  8/27/2018 Yes            Neuro   Hepatic encephalopathy   Continue lactulose, ammonia still 144 will increase lactulose, change to rectal due to n.v     Respiratory  Put on nc O2 ,      Cardiology  Tachy: due to metabolic acid vs ascites vs ?PE   Will have v/q scan today   Improving per metoprolol   HTN-on nadolol      GI   GI bleeding  Will contact hepatology or gi   will start octreotide, ppi Q12 , ppi   H/h monitoring      Cirrhosis and ascites   Will have paracentesis, will give albumin   [facility]   On transplant list     hcv -f/u hepatology at [facility]      Heme:   Anemia-chronic, will continue h/h monitor   Elevated inr : due to liver disease     Renal   juan on ckd3-4   Renal called,   List of renal transplant   Metabolic acidosis- will give one time bicarb , on fluid     ID : treat for uti   Sepsis protocol   Disposition :tbd,     Need transfer to [facility]. Poor prognosis.  Will have palliative care on board     Pt was tachycardia to 170 was transferred to icu, vomited blood  Per rn reported    Wife was at the bedside. called [facility] center- on divert, will not accept pt. 45 minutes of critical care time spent in the direct evaluation and treatment of this high risk patient. The reason for providing this level of medical care for this critically ill patient was due a critical illness that impaired one or more vital organ systems such that there was a high probability of imminent or life threatening deterioration in the patients condition. This care involved high complexity decision making to assess, manipulate, and support vital system functions, to treat this degreee vital organ system failure and to prevent further life threatening deterioration of the patients condition. Review of systems:    General: No fevers or chills. Cardiovascular: No chest pain or pressure. No palpitations. Pulmonary: shortness of breath. Gastrointestinal: nausea, vomiting. Vital signs/Intake and Output:  Visit Vitals    /88 (BP Patient Position: At rest;Supine)    Pulse (!) 106    Temp 97.9 °F (36.6 °C)    Resp 18    Ht 5' 9\" (1.753 m)    Wt 66.9 kg (147 lb 7.8 oz)    SpO2 100%    BMI 21.78 kg/m2     Current Shift:     Last three shifts:       Physical Exam:  General: WD, WN. Alert, cooperative, no acute distress    HEENT: NC, Atraumatic. PERRLA, anicteric sclerae. Lungs: CTA Bilaterally. No Wheezing/Rhonchi/Rales. Heart:  tachy,  +murmur, No Rubs, No Gallops  Abdomen: Soft, distended, Non tender.  +Bowel sounds,   Extremities: No c/c/e  Psych:   Not anxious or agitated. Neurologic:  No acute neurological deficit.              Labs: Results:       Chemistry Recent Labs      08/28/18   0330  08/27/18   1415   GLU  120*  120*   NA  141  140   K  4.8  5.2   CL  106  104   CO2  19*  24   BUN  50*  57*   CREA  4.87*  4.95*   CA  9.3  9.4   AGAP  16  12   BUCR  10*  12   AP  173*  191*   TP  7.1  7.4   ALB  3.6  3.7   GLOB  3.5  3.7   AGRAT  1.0  1.0      CBC w/Diff Recent Labs 08/28/18   0330  08/27/18   1415   WBC  8.0  8.5   RBC  3.74*  3.75*   HGB  11.4*  11.5*   HCT  33.9*  33.4*   PLT  125*  111*   GRANS   --   68   LYMPH   --   19*   EOS   --   1      Cardiac Enzymes Recent Labs      08/28/18   0330  08/27/18   1415   CPK  191  141   CKND1  1.2  1.1      Coagulation Recent Labs      08/27/18   1415   PTP  18.7*   INR  1.6*   APTT  34.6       Lipid Panel No results found for: CHOL, CHOLPOCT, CHOLX, CHLST, CHOLV, 076457, HDL, LDL, LDLC, DLDLP, 208183, VLDLC, VLDL, TGLX, TRIGL, TRIGP, TGLPOCT, CHHD, CHHDX   BNP No results for input(s): BNPP in the last 72 hours.    Liver Enzymes Recent Labs      08/28/18   0330   TP  7.1   ALB  3.6   AP  173*   SGOT  43*      Thyroid Studies No results found for: T4, T3U, TSH, TSHEXT, TSHEXT     Procedures/imaging: see electronic medical records for all procedures/Xrays and details which were not copied into this note but were reviewed prior to creation of Mahendra Cantu MD

## 2018-08-28 NOTE — PROGRESS NOTES
Hospitalist Progress Note    Patient: Sarah Reynolds MRN: [id number]  CSN: [id number]    YOB: 1960  Age: 62 y.o.   Sex: male    DOA: 8/27/2018 LOS:  LOS: 1 day          Lactate at over 6  Sepsis bundle already initiated  He hs received weight based fluid challenge with IVF from ER and over th early am  IV abx in place  Follow serial lactic acids  Get stat KUB as has had more vomiting, but is stooling also    If [facility] can take him today it may be best option as he is on transplant list-to be seen by intensivist and nephrology  Amaod placed for ARF  Faith Alvarado MD

## 2018-08-28 NOTE — ROUTINE PROCESS
1603 Patient arrived on unit via bed, accompanied by Rosa Letty and wife to room 348. Telemetry strip verified with Fernandez, no changes from previous rhythm. [address] from transfer center called to put in request for a bed to transport patient to [facility]. Lisa Mckenzie will call back when room is available and transportation can be arranged. [address] from [facility] transfer center with bed assignment. Patient will be transferred by Medical transport to [facility] 5 th floor room 17 B. Call report to [phone]. Notify transport center when time arranged.

## 2018-08-28 NOTE — ROUTINE PROCESS
TRANSFER - OUT REPORT:    Verbal report given to Amaury Reynoso RN on Natan Montero  being transferred to ICUfor change in patient condition(Tachycardic and hypertensive)       Report consisted of patients Situation, Background, Assessment and   Recommendations(SBAR). Information from the following report(s) change in patient condition(elevated BP and pulse ) was reviewed with the receiving nurse. Lines:   Peripheral IV 08/27/18 Right Forearm (Active)   Site Assessment Clean, dry, & intact 8/28/2018  1:05 AM   Phlebitis Assessment 0 8/28/2018  1:05 AM   Infiltration Assessment 0 8/28/2018  1:05 AM   Dressing Status Clean, dry, & intact 8/28/2018  1:05 AM   Dressing Type Tape;Transparent 8/28/2018  1:05 AM   Hub Color/Line Status Pink; Infusing 8/28/2018  1:05 AM   Action Taken Open ports on tubing capped 8/28/2018  1:05 AM   Alcohol Cap Used Yes 8/28/2018  1:05 AM        Opportunity for questions and clarification was provided.       Patient transported with:   Monitor  O2 @ 2 liters  Registered Nurse

## 2018-08-28 NOTE — ROUTINE PROCESS
TRANSFER - IN REPORT:    2050 Verbal report received from [address] [facility] on [address]  being received from ED for change in patient condition(high ammonia creatinine level)     Report consisted of patients Situation, Background, Assessment and   Recommendations(SBAR). Information from the following report(s) SBAR, Kardex, ED Summary, Procedure Summary, Intake/Output, MAR, Recent Results and Cardiac Rhythm sinus tachy was reviewed with the receiving nurse. Opportunity for questions and clarification was provided. Assessment completed upon patients arrival to unit and care assumed.

## 2018-08-29 LAB
ATRIAL RATE: 119 BPM
ATRIAL RATE: 78 BPM
CALCULATED P AXIS, ECG09: 37 DEGREES
CALCULATED P AXIS, ECG09: 61 DEGREES
CALCULATED R AXIS, ECG10: 46 DEGREES
CALCULATED R AXIS, ECG10: 76 DEGREES
CALCULATED T AXIS, ECG11: 1 DEGREES
CALCULATED T AXIS, ECG11: 29 DEGREES
DIAGNOSIS, 93000: NORMAL
DIAGNOSIS, 93000: NORMAL
P-R INTERVAL, ECG05: 156 MS
P-R INTERVAL, ECG05: 164 MS
Q-T INTERVAL, ECG07: 308 MS
Q-T INTERVAL, ECG07: 378 MS
QRS DURATION, ECG06: 82 MS
QRS DURATION, ECG06: 98 MS
QTC CALCULATION (BEZET), ECG08: 430 MS
QTC CALCULATION (BEZET), ECG08: 433 MS
VENTRICULAR RATE, ECG03: 119 BPM
VENTRICULAR RATE, ECG03: 78 BPM

## 2018-08-29 NOTE — PROGRESS NOTES
1910. Verbal bedside received from [facility],  [phone] off going nurse. Assumed patient care, bed in low position, call light within reach, white board updated. Awaiting to transfer patient to [address] [place] [address] 
 
2010 Patient had an incontinet episode, cleaned and changed line. VS completed and forms signed for EMTALA. 2030 Nursing supervisor signed forms, patient transferred to [address] [place] [address] by medical transport.

## 2018-08-30 LAB
BACTERIA SPEC CULT: NORMAL
BACTERIA SPEC CULT: NORMAL
SERVICE CMNT-IMP: NORMAL
SERVICE CMNT-IMP: NORMAL

## 2018-09-03 LAB
BACTERIA SPEC CULT: NORMAL
SERVICE CMNT-IMP: NORMAL

## 2018-12-11 ENCOUNTER — HOSPITAL ENCOUNTER (EMERGENCY)
Age: 58
Discharge: HOME | End: 2018-12-11
Attending: EMERGENCY MEDICINE
Payer: COMMERCIAL

## 2018-12-11 VITALS
HEART RATE: 85 BPM | WEIGHT: 175 LBS | BODY MASS INDEX: 25.92 KG/M2 | TEMPERATURE: 97.9 F | SYSTOLIC BLOOD PRESSURE: 181 MMHG | OXYGEN SATURATION: 100 % | DIASTOLIC BLOOD PRESSURE: 90 MMHG | HEIGHT: 69 IN | RESPIRATION RATE: 18 BRPM

## 2018-12-11 DIAGNOSIS — S80.12XA TRAUMATIC HEMATOMA OF LEFT LOWER LEG, INITIAL ENCOUNTER: Primary | ICD-10-CM

## 2018-12-11 PROCEDURE — 99282 EMERGENCY DEPT VISIT SF MDM: CPT

## 2018-12-11 RX ORDER — SULFAMETHOXAZOLE AND TRIMETHOPRIM 400; 80 MG/1; MG/1
1 TABLET ORAL DAILY
COMMUNITY

## 2018-12-11 RX ORDER — AMLODIPINE BESYLATE 10 MG/1
10 TABLET ORAL DAILY
COMMUNITY

## 2018-12-11 RX ORDER — ASPIRIN 325 MG
325 TABLET ORAL DAILY
COMMUNITY

## 2018-12-11 RX ORDER — MYCOPHENOLATE MOFETIL 500 MG/1
1000 TABLET ORAL 2 TIMES DAILY
COMMUNITY

## 2018-12-11 NOTE — DISCHARGE INSTRUCTIONS

## 2018-12-11 NOTE — ED PROVIDER NOTES
EMERGENCY DEPARTMENT HISTORY AND PHYSICAL EXAM    Date: 12/11/2018  Patient Name: Luz Maria Ramirez    History of Presenting Illness     Chief Complaint   Patient presents with    Leg Pain         History Provided By: Patient    Chief Complaint: left anterior lower leg pain  Duration: 1 Hours  Timing:  Acute  Location: left anterior lower leg  Modifying Factors: secondary to hitting it against a bike pedal; no medications  Associated Symptoms: localized swelling, paresthesias of the left foot, and an antalgic gait    Additional History (Context):   2:53 PM   Luz Maria Ramirez is a 62 y.o. male with PMHX of liver and kidney transplant on 9/5/2018 who presents to the emergency department C/O left anterior lower leg pain s/p accidentally hitting it against a bike pedal approximately 1 hour ago. Associated sxs include localized swelling, paresthesias of the left foot, and an antalgic gait. Denies blood thinner use; he has not taken any medications for his current symptoms. Denies cigarette use, etoh use, and illicit drug use. Pt denies wounds, and any other sxs or complaints. PCP: None    Current Outpatient Medications   Medication Sig Dispense Refill    trimethoprim-sulfamethoxazole (BACTRIM)  mg per tablet Take 1 Tab by mouth daily.  mycophenolate (CELLCEPT) 500 mg tablet Take 1,000 mg by mouth two (2) times a day.  PREDNISONE, BULK, by Does Not Apply route.  aspirin (ASPIRIN) 325 mg tablet Take 325 mg by mouth daily.  amLODIPine (NORVASC) 10 mg tablet Take 10 mg by mouth daily.  octreotide (SANDOSTATIN) infusion 25 mcg/hr by IntraVENous route continuous. 1 Each 0       Past History     Past Medical History:  Past Medical History:   Diagnosis Date    Hypertension     Kidney transplant recipient     Liver failure ([facility]     Liver transplant recipient [facility]) 09/05/2018       Past Surgical History:  History reviewed. No pertinent surgical history.     Family History:  History reviewed. No pertinent family history. Social History:  Social History     Tobacco Use    Smoking status: Former Smoker    Smokeless tobacco: Never Used   Substance Use Topics    Alcohol use: No    Drug use: No       Allergies:  No Known Allergies      Review of Systems   Review of Systems   Musculoskeletal: Positive for gait problem (antalgic). Left anterior lower leg pain, swelling to left anterior lower leg   Skin: Negative for wound. Neurological: Positive for numbness (paresthesias of the left foot). All other systems reviewed and are negative. Physical Exam     Vitals:    12/11/18 1435   BP: 181/90   Pulse: 85   Resp: 18   Temp: 97.9 °F (36.6 °C)   SpO2: 100%   Weight: 79.4 kg (175 lb)   Height: 5' 9\" (1.753 m)     Physical Exam   Constitutional: He is oriented to person, place, and time. He appears well-developed and well-nourished. No distress. HENT:   Head: Normocephalic and atraumatic. Eyes: Conjunctivae and EOM are normal. Pupils are equal, round, and reactive to light. Neck: Normal range of motion. Neck supple. Musculoskeletal: Normal range of motion. He exhibits no edema or deformity. Neurological: He is alert and oriented to person, place, and time. Skin: Skin is warm, dry and intact. Ecchymosis noted. Psychiatric: He has a normal mood and affect. His behavior is normal.   Nursing note and vitals reviewed. Diagnostic Study Results     Labs -   No results found for this or any previous visit (from the past 12 hour(s)). Radiologic Studies -   No orders to display     CT Results  (Last 48 hours)    None        CXR Results  (Last 48 hours)    None          Medications given in the ED-  Medications - No data to display      Medical Decision Making   I am the first provider for this patient. I reviewed the vital signs, available nursing notes, past medical history, past surgical history, family history and social history.     Vital Signs-Reviewed the patient's vital signs. Pulse Oximetry Analysis - 100% on room air     Records Reviewed: Nursing Notes    Procedures:  Procedures    ED Course:   2:53 PM Initial assessment performed. The patients presenting problems have been discussed, and they are in agreement with the care plan formulated and outlined with them. I have encouraged them to ask questions as they arise throughout their visit. Discussion: 63yo M c/o traumatic hematoma LLE from a bike pedal, no open wounds, ambulatory without difficulty. RICE instructions given    Diagnosis and Disposition       DISCHARGE NOTE:  3:10 PM   Rosa Castaneda's  results have been reviewed with him. He has been counseled regarding his diagnosis, treatment, and plan. He verbally conveys understanding and agreement of the signs, symptoms, diagnosis, treatment and prognosis and additionally agrees to follow up as discussed. He also agrees with the care-plan and conveys that all of his questions have been answered. I have also provided discharge instructions for him that include: educational information regarding their diagnosis and treatment, and list of reasons why they would want to return to the ED prior to their follow-up appointment, should his condition change. He has been provided with education for proper emergency department utilization. CLINICAL IMPRESSION:    1. Traumatic hematoma of left lower leg, initial encounter        PLAN:  1. D/C Home  2. Current Discharge Medication List        3. Follow-up Information     Follow up With Specialties Details Why Contact Info    your doctor  Schedule an appointment as soon as possible for a visit      [facility] EMERGENCY DEPT Emergency Medicine  As needed, If symptoms worsen 2 Sandra Glass  [address] [facility] [address] [place] [address]    [address] [facility]    [id number] [place] [address]  [phone]        _______________________________    Attestations:   This note is prepared by Rosa Horner, acting as Scribe for Demetris Rivas PA-C. Demetris Rivas PA-C:  The scribe's documentation has been prepared under my direction and personally reviewed by me in its entirety.   I confirm that the note above accurately reflects all work, treatment, procedures, and medical decision making performed by me.  _______________________________

## 2018-12-11 NOTE — ED TRIAGE NOTES
Triage: pt bumped L tib fib on bike pedal approx 1 hour ago. Pt noticed that the area has swollen significatly in the past 30 minutes and that he has tingling/numbness to L foot since swelling began. Pt with kidney and liver transplant on Sept 5.